# Patient Record
Sex: MALE | HISPANIC OR LATINO | Employment: UNEMPLOYED | URBAN - METROPOLITAN AREA
[De-identification: names, ages, dates, MRNs, and addresses within clinical notes are randomized per-mention and may not be internally consistent; named-entity substitution may affect disease eponyms.]

---

## 2020-10-08 ENCOUNTER — OFFICE VISIT (OUTPATIENT)
Dept: INTERNAL MEDICINE | Facility: CLINIC | Age: 35
End: 2020-10-08

## 2020-10-08 ENCOUNTER — LAB (OUTPATIENT)
Dept: LAB | Facility: HOSPITAL | Age: 35
End: 2020-10-08

## 2020-10-08 VITALS
RESPIRATION RATE: 16 BRPM | TEMPERATURE: 97.8 F | DIASTOLIC BLOOD PRESSURE: 82 MMHG | BODY MASS INDEX: 29.09 KG/M2 | HEIGHT: 65 IN | WEIGHT: 174.6 LBS | SYSTOLIC BLOOD PRESSURE: 116 MMHG | HEART RATE: 60 BPM | OXYGEN SATURATION: 97 %

## 2020-10-08 DIAGNOSIS — Z00.00 HEALTHCARE MAINTENANCE: ICD-10-CM

## 2020-10-08 DIAGNOSIS — H02.843 EYELID GLAND SWELLING, RIGHT: Primary | ICD-10-CM

## 2020-10-08 DIAGNOSIS — Z13.29 SCREENING FOR THYROID DISORDER: ICD-10-CM

## 2020-10-08 DIAGNOSIS — Z13.1 SCREENING FOR DIABETES MELLITUS: ICD-10-CM

## 2020-10-08 DIAGNOSIS — Z13.220 SCREENING, LIPID: ICD-10-CM

## 2020-10-08 DIAGNOSIS — Z11.59 ENCOUNTER FOR HEPATITIS C SCREENING TEST FOR LOW RISK PATIENT: ICD-10-CM

## 2020-10-08 DIAGNOSIS — H49.819 KEARNS-SAYRE SYNDROME, UNSPECIFIED LATERALITY (HCC): ICD-10-CM

## 2020-10-08 LAB
ALBUMIN SERPL-MCNC: 4.4 G/DL (ref 3.5–5.2)
ALBUMIN/GLOB SERPL: 1.2 G/DL
ALP SERPL-CCNC: 81 U/L (ref 39–117)
ALT SERPL W P-5'-P-CCNC: 78 U/L (ref 1–41)
ANION GAP SERPL CALCULATED.3IONS-SCNC: 9.6 MMOL/L (ref 5–15)
AST SERPL-CCNC: 42 U/L (ref 1–40)
BILIRUB SERPL-MCNC: 0.4 MG/DL (ref 0–1.2)
BUN SERPL-MCNC: 8 MG/DL (ref 6–20)
BUN/CREAT SERPL: 9.6 (ref 7–25)
CALCIUM SPEC-SCNC: 9.4 MG/DL (ref 8.6–10.5)
CHLORIDE SERPL-SCNC: 104 MMOL/L (ref 98–107)
CHOLEST SERPL-MCNC: 215 MG/DL (ref 0–200)
CO2 SERPL-SCNC: 28.4 MMOL/L (ref 22–29)
CREAT SERPL-MCNC: 0.83 MG/DL (ref 0.76–1.27)
DEPRECATED RDW RBC AUTO: 40.6 FL (ref 37–54)
ERYTHROCYTE [DISTWIDTH] IN BLOOD BY AUTOMATED COUNT: 13 % (ref 12.3–15.4)
GFR SERPL CREATININE-BSD FRML MDRD: 105 ML/MIN/1.73
GFR SERPL CREATININE-BSD FRML MDRD: 128 ML/MIN/1.73
GLOBULIN UR ELPH-MCNC: 3.7 GM/DL
GLUCOSE SERPL-MCNC: 90 MG/DL (ref 65–99)
HBA1C MFR BLD: 5.82 % (ref 4.8–5.6)
HCT VFR BLD AUTO: 49.2 % (ref 37.5–51)
HCV AB SER DONR QL: NORMAL
HDLC SERPL-MCNC: 28 MG/DL (ref 40–60)
HGB BLD-MCNC: 15.8 G/DL (ref 13–17.7)
LDLC SERPL CALC-MCNC: 164 MG/DL (ref 0–100)
LDLC/HDLC SERPL: 5.78 {RATIO}
MCH RBC QN AUTO: 27.7 PG (ref 26.6–33)
MCHC RBC AUTO-ENTMCNC: 32.1 G/DL (ref 31.5–35.7)
MCV RBC AUTO: 86.2 FL (ref 79–97)
PLATELET # BLD AUTO: 211 10*3/MM3 (ref 140–450)
PMV BLD AUTO: 13.6 FL (ref 6–12)
POTASSIUM SERPL-SCNC: 4.1 MMOL/L (ref 3.5–5.2)
PROT SERPL-MCNC: 8.1 G/DL (ref 6–8.5)
RBC # BLD AUTO: 5.71 10*6/MM3 (ref 4.14–5.8)
SODIUM SERPL-SCNC: 142 MMOL/L (ref 136–145)
TRIGL SERPL-MCNC: 126 MG/DL (ref 0–150)
TSH SERPL DL<=0.05 MIU/L-ACNC: 2.1 UIU/ML (ref 0.27–4.2)
VLDLC SERPL-MCNC: 23 MG/DL (ref 5–40)
WBC # BLD AUTO: 9.76 10*3/MM3 (ref 3.4–10.8)

## 2020-10-08 PROCEDURE — 85027 COMPLETE CBC AUTOMATED: CPT | Performed by: INTERNAL MEDICINE

## 2020-10-08 PROCEDURE — 86803 HEPATITIS C AB TEST: CPT | Performed by: INTERNAL MEDICINE

## 2020-10-08 PROCEDURE — 83036 HEMOGLOBIN GLYCOSYLATED A1C: CPT | Performed by: INTERNAL MEDICINE

## 2020-10-08 PROCEDURE — 84443 ASSAY THYROID STIM HORMONE: CPT | Performed by: INTERNAL MEDICINE

## 2020-10-08 PROCEDURE — 80053 COMPREHEN METABOLIC PANEL: CPT | Performed by: INTERNAL MEDICINE

## 2020-10-08 PROCEDURE — 80061 LIPID PANEL: CPT | Performed by: INTERNAL MEDICINE

## 2020-10-08 PROCEDURE — 99204 OFFICE O/P NEW MOD 45 MIN: CPT | Performed by: INTERNAL MEDICINE

## 2020-10-08 RX ORDER — HYDROXYZINE HYDROCHLORIDE 25 MG/1
25 TABLET, FILM COATED ORAL
COMMUNITY
Start: 2020-09-25 | End: 2020-10-08 | Stop reason: SINTOL

## 2020-10-08 NOTE — PROGRESS NOTES
"Internal Medicine New Patient  Thomas Rausch is a 34 y.o. male who presents today to establish care and with concerns as outlined below.    Chief Complaint  Chief Complaint   Patient presents with   • Establish Care     New PT        HPI  Mr. Rausch comes in today to establish care. He has not had a recent PCP but has recently been seen by a dermatologist for dark and itchy spots on several places on his body. He was prescribed hydroxyzine for the itching but it caused him to feel dizzy so he quit. He was also prescribed a cream but has to go pick it up.    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 He reports that he was in [place] at the time of diagnosis. He was diagnosed by muscle biopsy. He was told that his muscles would deteriorate at a faster rate than normal and he should supplement with creatine, which he has been doing. He notes that in the past he has had a muscle that has \"gone out of place\" in the back of his right leg resulting in pain. 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 He denies weakness. In the past he has seen a chiropractor for his muscle pains. He has vision loss and was last seen for vision check 2 weeks ago. He has prescription glasses. He notes that he has been having right outer eye swelling and \"hardness\" on and off.       Review of Systems  Review of Systems   Constitutional: Negative.    Eyes: Positive for pain (right eye swelling on and off) and visual disturbance. Negative for discharge and redness.   Respiratory: Negative.    Cardiovascular: Negative.    Gastrointestinal: Negative.    Genitourinary: Negative.    Musculoskeletal: Positive for myalgias. Negative for gait problem.   Skin: Positive for rash.   Allergic/Immunologic: Positive for environmental allergies.   Neurological: Negative for weakness and numbness.   Psychiatric/Behavioral: Negative.         Past Medical History  History reviewed. No " "pertinent past medical history.     Surgical History  Past Surgical History:   Procedure Laterality Date   • CYST REMOVAL Right     1992   • EYE SURGERY          Family History  History reviewed. No pertinent family history.     Social History  Social History     Socioeconomic History   • Marital status: Single     Spouse name: Not on file   • Number of children: Not on file   • Years of education: Not on file   • Highest education level: Not on file   Tobacco Use   • Smoking status: Never Smoker   • Smokeless tobacco: Never Used   Substance and Sexual Activity   • Alcohol use: Yes     Alcohol/week: 2.0 standard drinks     Types: 2 Cans of beer per week     Comment: Occ   • Drug use: Never        Current Medications  Current Outpatient Medications on File Prior to Visit   Medication Sig Dispense Refill   • hydrOXYzine (ATARAX) 25 MG tablet Take 25 mg by mouth every night at bedtime.       No current facility-administered medications on file prior to visit.        Allergies  No Known Allergies     Objective  Visit Vitals  /82   Pulse (!) 44   Temp 97.8 °F (36.6 °C)   Resp 16   Ht 165.1 cm (65\")   Wt 79.2 kg (174 lb 9.6 oz)   SpO2 97%   BMI 29.05 kg/m²        Physical Exam  Physical Exam  Vitals signs and nursing note reviewed.   Constitutional:       General: He is not in acute distress.     Appearance: Normal appearance. He is well-developed and normal weight. He is not diaphoretic.   HENT:      Head: Normocephalic and atraumatic.      Right Ear: Tympanic membrane, ear canal and external ear normal.      Left Ear: Tympanic membrane, ear canal and external ear normal.      Nose: Nose normal.   Eyes:      General: No scleral icterus.        Right eye: No discharge.         Left eye: No discharge.      Conjunctiva/sclera: Conjunctivae normal.      Pupils: Pupils are equal, round, and reactive to light.      Comments: Mild swelling of glandular appearing tissue beneath upper outer eyelid. No swelling or deformity " of actual globe.   Neck:      Musculoskeletal: Neck supple.   Cardiovascular:      Rate and Rhythm: Normal rate and regular rhythm.      Heart sounds: Normal heart sounds. No murmur.   Pulmonary:      Effort: Pulmonary effort is normal. No respiratory distress.      Breath sounds: Normal breath sounds.   Abdominal:      General: Bowel sounds are normal. There is no distension.      Palpations: Abdomen is soft.      Tenderness: There is no abdominal tenderness.   Musculoskeletal:         General: No deformity.      Right lower leg: No edema.      Left lower leg: No edema.   Lymphadenopathy:      Cervical: No cervical adenopathy.   Skin:     General: Skin is warm and dry.   Neurological:      General: No focal deficit present.      Mental Status: He is alert and oriented to person, place, and time.      Motor: No weakness.      Gait: Gait normal.   Psychiatric:         Mood and Affect: Mood normal.         Behavior: Behavior normal.          Results  No results found for this or any previous visit.     Assessment and Plan  Thomas was seen today for establish care.    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 Ordered today.    [place] syndrome, unspecified laterality (CMS/HCC)  - Diagnosed over 20y ago in [place] at [facility] via muscle bx. Records not available  - Not well known to me, appears to cause a heterogeneous group of symptoms including myopathy, retinopathy, cardiomyopathy, progressive ophthalmoplegia, hearing loss, endocrine issues including DM. Subjectively he does not endorse much but exam with evidence of probable ophthalmoplegia.  - Given that I am not familiar with this disorder or its care I think it best to refer to specialist, presumably neurology. Referral placed today.  - Also referred to ophthalmology as above  - No signs of  cardiac disease at this time, defer cardiology referral however may consider in the future.    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  - HCV: ordered  - Immunizations: declined flu.  - Depression screening: PHQ2 = 2 10/2020    Return in about 1 year (around 10/8/2021) for Annual, Labs today.

## 2020-10-11 DIAGNOSIS — R79.89 ELEVATED LFTS: Primary | ICD-10-CM

## 2021-03-04 ENCOUNTER — LAB (OUTPATIENT)
Dept: LAB | Facility: HOSPITAL | Age: 36
End: 2021-03-04

## 2021-03-04 ENCOUNTER — OFFICE VISIT (OUTPATIENT)
Dept: INTERNAL MEDICINE | Facility: CLINIC | Age: 36
End: 2021-03-04

## 2021-03-04 VITALS
BODY MASS INDEX: 29.46 KG/M2 | WEIGHT: 176.8 LBS | RESPIRATION RATE: 16 BRPM | HEART RATE: 59 BPM | SYSTOLIC BLOOD PRESSURE: 114 MMHG | TEMPERATURE: 97.1 F | DIASTOLIC BLOOD PRESSURE: 64 MMHG | HEIGHT: 65 IN | OXYGEN SATURATION: 98 %

## 2021-03-04 DIAGNOSIS — R74.01 TRANSAMINITIS: ICD-10-CM

## 2021-03-04 DIAGNOSIS — H49.819 KEARNS-SAYRE SYNDROME, UNSPECIFIED LATERALITY (HCC): ICD-10-CM

## 2021-03-04 DIAGNOSIS — S16.1XXA STRAIN OF NECK MUSCLE, INITIAL ENCOUNTER: Primary | ICD-10-CM

## 2021-03-04 LAB
ALBUMIN SERPL-MCNC: 4.3 G/DL (ref 3.5–5.2)
ALBUMIN/GLOB SERPL: 1.3 G/DL
ALP SERPL-CCNC: 72 U/L (ref 39–117)
ALT SERPL W P-5'-P-CCNC: 105 U/L (ref 1–41)
ANION GAP SERPL CALCULATED.3IONS-SCNC: 10.1 MMOL/L (ref 5–15)
AST SERPL-CCNC: 79 U/L (ref 1–40)
BILIRUB SERPL-MCNC: 0.4 MG/DL (ref 0–1.2)
BUN SERPL-MCNC: 9 MG/DL (ref 6–20)
BUN/CREAT SERPL: 28.1 (ref 7–25)
CALCIUM SPEC-SCNC: 9.2 MG/DL (ref 8.6–10.5)
CHLORIDE SERPL-SCNC: 102 MMOL/L (ref 98–107)
CO2 SERPL-SCNC: 27.9 MMOL/L (ref 22–29)
CREAT SERPL-MCNC: 0.32 MG/DL (ref 0.76–1.27)
GFR SERPL CREATININE-BSD FRML MDRD: >150 ML/MIN/1.73
GFR SERPL CREATININE-BSD FRML MDRD: >150 ML/MIN/1.73
GLOBULIN UR ELPH-MCNC: 3.3 GM/DL
GLUCOSE SERPL-MCNC: 87 MG/DL (ref 65–99)
POTASSIUM SERPL-SCNC: 4.4 MMOL/L (ref 3.5–5.2)
PROT SERPL-MCNC: 7.6 G/DL (ref 6–8.5)
SODIUM SERPL-SCNC: 140 MMOL/L (ref 136–145)

## 2021-03-04 PROCEDURE — 99213 OFFICE O/P EST LOW 20 MIN: CPT | Performed by: INTERNAL MEDICINE

## 2021-03-04 PROCEDURE — 80053 COMPREHEN METABOLIC PANEL: CPT

## 2021-03-04 NOTE — PROGRESS NOTES
"Internal Medicine Acute Visit    Chief Complaint   Patient presents with   • Neck Pain   • Hypertension        HPI  Mr. Rausch comes in today after concern for high blood pressure. He notes 2 weeks of neck pain. He describes this as sensation of blood rushing. He denies changes in his vision, headaches, chest pain, palpitations. He thought his BP was running high and therefore has been drinking water and garlic. He had reada that this would lower blood pressure. He has not actually checked his blood pressure at home. He feels that the pain is improving. He otherwise reports that he did see Dr. Sales with  neurology via telehealth with plan for in office visit in 2 weeks. Noted elevated LFTs on recent labs. He denies alcohol or excessive tylenol use.       Review of Systems  Review of Systems   Constitutional: Negative for chills, fatigue and fever.   Eyes: Positive for visual disturbance (chronic, no worsening).   Respiratory: Negative for shortness of breath.    Cardiovascular: Negative for chest pain and palpitations.   Gastrointestinal: Negative for abdominal pain.   Musculoskeletal: Positive for neck pain.   Skin: Negative for color change.   Neurological: Negative for headache.        Medications  No current outpatient medications on file prior to visit.     No current facility-administered medications on file prior to visit.         Allergies  No Known Allergies    PMH  History reviewed. No pertinent past medical history.    Objective  Visit Vitals  /64   Pulse 59   Temp 97.1 °F (36.2 °C)   Resp 16   Ht 165.1 cm (65\")   Wt 80.2 kg (176 lb 12.8 oz)   SpO2 98%   BMI 29.42 kg/m²        Physical Exam  Physical Exam  Vitals signs and nursing note reviewed.   Constitutional:       General: He is not in acute distress.     Appearance: He is well-developed. He is not ill-appearing.   HENT:      Head: Normocephalic and atraumatic.      Nose: Nose normal.   Eyes:      Conjunctiva/sclera: Conjunctivae normal.      " Comments: Dysconjugate gaze with drooping of eyelids   Pulmonary:      Effort: Pulmonary effort is normal. No respiratory distress.   Musculoskeletal: Normal range of motion.         General: Tenderness (neck musculature near base of skull) present. No deformity.   Skin:     General: Skin is warm and dry.      Coloration: Skin is not jaundiced.   Neurological:      Mental Status: He is alert and oriented to person, place, and time. Mental status is at baseline.   Psychiatric:         Mood and Affect: Mood normal.         Behavior: Behavior normal.         Results  Results for orders placed or performed in visit on 10/08/20   CBC (No Diff)    Specimen: Blood   Result Value Ref Range    WBC 9.76 3.40 - 10.80 10*3/mm3    RBC 5.71 4.14 - 5.80 10*6/mm3    Hemoglobin 15.8 13.0 - 17.7 g/dL    Hematocrit 49.2 37.5 - 51.0 %    MCV 86.2 79.0 - 97.0 fL    MCH 27.7 26.6 - 33.0 pg    MCHC 32.1 31.5 - 35.7 g/dL    RDW 13.0 12.3 - 15.4 %    RDW-SD 40.6 37.0 - 54.0 fl    MPV 13.6 (H) 6.0 - 12.0 fL    Platelets 211 140 - 450 10*3/mm3   Comprehensive Metabolic Panel    Specimen: Blood   Result Value Ref Range    Glucose 90 65 - 99 mg/dL    BUN 8 6 - 20 mg/dL    Creatinine 0.83 0.76 - 1.27 mg/dL    Sodium 142 136 - 145 mmol/L    Potassium 4.1 3.5 - 5.2 mmol/L    Chloride 104 98 - 107 mmol/L    CO2 28.4 22.0 - 29.0 mmol/L    Calcium 9.4 8.6 - 10.5 mg/dL    Total Protein 8.1 6.0 - 8.5 g/dL    Albumin 4.40 3.50 - 5.20 g/dL    ALT (SGPT) 78 (H) 1 - 41 U/L    AST (SGOT) 42 (H) 1 - 40 U/L    Alkaline Phosphatase 81 39 - 117 U/L    Total Bilirubin 0.4 0.0 - 1.2 mg/dL    eGFR Non African Amer 105 >60 mL/min/1.73    eGFR  African Amer 128 >60 mL/min/1.73    Globulin 3.7 gm/dL    A/G Ratio 1.2 g/dL    BUN/Creatinine Ratio 9.6 7.0 - 25.0    Anion Gap 9.6 5.0 - 15.0 mmol/L   Lipid Panel    Specimen: Blood   Result Value Ref Range    Total Cholesterol 215 (H) 0 - 200 mg/dL    Triglycerides 126 0 - 150 mg/dL    HDL Cholesterol 28 (L) 40 - 60 mg/dL     LDL Cholesterol  164 (H) 0 - 100 mg/dL    VLDL Cholesterol 23 5 - 40 mg/dL    LDL/HDL Ratio 5.78    Hemoglobin A1c    Specimen: Blood   Result Value Ref Range    Hemoglobin A1C 5.82 (H) 4.80 - 5.60 %   TSH Rfx On Abnormal To Free T4    Specimen: Blood   Result Value Ref Range    TSH 2.100 0.270 - 4.200 uIU/mL   Hepatitis C Antibody    Specimen: Blood   Result Value Ref Range    Hepatitis C Ab Non-Reactive Non-Reactive        Assessment and Plan  Diagnoses and all orders for this visit:    Strain of neck muscle, initial encounter  - Discussed that BP is normal and unlikely the cause of his neck discomfort which is more likely related to neck muscle strain.  - Recommended OTC tylenol and ibuprofen if needed however pain seems to be improving already    Transaminitis  - Noted to have LFTs mildly elevated on recent labs with no other abnormality on hepatic function panel or CBC. No clinical signs of liver failure or disease.  - Denies alcohol or use of high doses of tylenol.  - Will repeat CMP today    [place] syndrome, unspecified laterality (CMS/HCC)  - Diagnosed over 20y ago in [place] at [facility] via muscle bx. Records not available  - Not well known to me, appears to cause a heterogeneous group of symptoms including myopathy, retinopathy, cardiomyopathy, progressive ophthalmoplegia, hearing loss, endocrine issues including DM. Has ophthalmoplegia on exam and prediabetes, HLD.  - Has been referred to neurology at , no records on  provider portal  - Also referred to ophthalmology  - No signs of cardiac disease at this time, defer cardiology referral however may consider in the future.    Health Maintenance  - Colonoscopy: Start screening at age 44.  - HCV: negative  - Immunizations: declined flu.  - Depression screening: PHQ2 = 2 10/2020    Return in about 7 months (around 10/8/2021) for as scheduled, Labs today.

## 2021-03-06 DIAGNOSIS — R74.01 TRANSAMINITIS: Primary | ICD-10-CM

## 2021-03-29 ENCOUNTER — TELEPHONE (OUTPATIENT)
Dept: INTERNAL MEDICINE | Facility: CLINIC | Age: 36
End: 2021-03-29

## 2021-03-29 NOTE — TELEPHONE ENCOUNTER
Caller: Thomas Rausch    Relationship: Self    Best call back number: [phone]    Caller requesting test results: YES    What test was performed: BLOOD WORK    When was the test performed: [facility] REC[facility]    Where was the test performed: IN OFFICE    Additional notes: